# Patient Record
Sex: FEMALE | URBAN - METROPOLITAN AREA
[De-identification: names, ages, dates, MRNs, and addresses within clinical notes are randomized per-mention and may not be internally consistent; named-entity substitution may affect disease eponyms.]

---

## 2022-05-17 ENCOUNTER — WEB ENCOUNTER (OUTPATIENT)
Dept: URBAN - METROPOLITAN AREA CLINIC 50 | Facility: CLINIC | Age: 62
End: 2022-05-17

## 2022-05-17 ENCOUNTER — LAB OUTSIDE AN ENCOUNTER (OUTPATIENT)
Dept: URBAN - METROPOLITAN AREA CLINIC 50 | Facility: CLINIC | Age: 62
End: 2022-05-17

## 2022-05-17 ENCOUNTER — OFFICE VISIT (OUTPATIENT)
Dept: URBAN - METROPOLITAN AREA CLINIC 50 | Facility: CLINIC | Age: 62
End: 2022-05-17
Payer: COMMERCIAL

## 2022-05-17 VITALS
SYSTOLIC BLOOD PRESSURE: 115 MMHG | BODY MASS INDEX: 26.93 KG/M2 | HEART RATE: 83 BPM | DIASTOLIC BLOOD PRESSURE: 84 MMHG | WEIGHT: 152 LBS | TEMPERATURE: 94.5 F | HEIGHT: 63 IN

## 2022-05-17 DIAGNOSIS — K70.31 ALCOHOLIC CIRRHOSIS OF LIVER WITH ASCITES: ICD-10-CM

## 2022-05-17 DIAGNOSIS — R11.0 NAUSEA: ICD-10-CM

## 2022-05-17 DIAGNOSIS — I85.00 ESOPHAGEAL VARICES WITHOUT BLEEDING, UNSPECIFIED ESOPHAGEAL VARICES TYPE: ICD-10-CM

## 2022-05-17 DIAGNOSIS — R19.7 DIARRHEA, UNSPECIFIED TYPE: ICD-10-CM

## 2022-05-17 PROBLEM — 14223005: Status: ACTIVE | Noted: 2022-05-17

## 2022-05-17 PROCEDURE — 99245 OFF/OP CONSLTJ NEW/EST HI 55: CPT | Performed by: INTERNAL MEDICINE

## 2022-05-17 PROCEDURE — 99204 OFFICE O/P NEW MOD 45 MIN: CPT | Performed by: INTERNAL MEDICINE

## 2022-05-17 RX ORDER — ONDANSETRON 4 MG/1
1 TABLET ON THE TONGUE AND ALLOW TO DISSOLVE PRN TABLET, ORALLY DISINTEGRATING ORAL ONCE A DAY
Qty: 20 | Refills: 0 | OUTPATIENT

## 2022-05-17 NOTE — HPI-TODAY'S VISIT:
referred by Dr. Pasquale Driver for cirrhosis copy of the note sent to referring MD six months of abdominal distension/LE edema. also w/ diarrhea. also w/ nausea. dirhea for last 3 days. taking immodium. was hospitalized at [facility] 3 years ago, unclear  3 plus years ago has had paracentesis pcp told her she has cirrhosis. nonsmoker. drink etoh, 10-12 beers/day, 15 plus years. has had problems w/ alcohol her whole life  used to drink a whole bottle of wine in the past, liquor no Hepatitis smokes weed daily reports hx of EV. denies GI bleeding  w/ her today. states he cannot stop providing her etoh b/c she will get shakes/seizures.

## 2022-05-20 ENCOUNTER — TELEPHONE ENCOUNTER (OUTPATIENT)
Dept: URBAN - METROPOLITAN AREA CLINIC 80 | Facility: CLINIC | Age: 62
End: 2022-05-20

## 2022-05-25 ENCOUNTER — CLAIMS CREATED FROM THE CLAIM WINDOW (OUTPATIENT)
Dept: URBAN - METROPOLITAN AREA CLINIC 79 | Facility: CLINIC | Age: 62
End: 2022-05-25

## 2022-05-25 ENCOUNTER — OFFICE VISIT (OUTPATIENT)
Dept: URBAN - METROPOLITAN AREA CLINIC 79 | Facility: CLINIC | Age: 62
End: 2022-05-25

## 2022-05-25 ENCOUNTER — CLAIMS CREATED FROM THE CLAIM WINDOW (OUTPATIENT)
Dept: URBAN - METROPOLITAN AREA CLINIC 79 | Facility: CLINIC | Age: 62
End: 2022-05-25
Payer: COMMERCIAL

## 2022-05-25 DIAGNOSIS — K70.31 ALCOHOLIC CIRRHOSIS OF LIVER WITH ASCITES: ICD-10-CM

## 2022-05-25 PROCEDURE — 76700 US EXAM ABDOM COMPLETE: CPT | Performed by: INTERNAL MEDICINE

## 2022-05-25 RX ORDER — ONDANSETRON 4 MG/1
1 TABLET ON THE TONGUE AND ALLOW TO DISSOLVE PRN TABLET, ORALLY DISINTEGRATING ORAL ONCE A DAY
Qty: 20 | Refills: 0 | Status: ACTIVE | COMMUNITY

## 2022-05-26 ENCOUNTER — TELEPHONE ENCOUNTER (OUTPATIENT)
Dept: URBAN - METROPOLITAN AREA CLINIC 92 | Facility: CLINIC | Age: 62
End: 2022-05-26

## 2022-05-26 LAB
A/G RATIO: 0.9
AFP, SERUM, TUMOR MARKER: 6.2
ALBUMIN: 3.2
ALKALINE PHOSPHATASE: 85
ALT (SGPT): 21
AST (SGOT): 38
BASO (ABSOLUTE): 0
BASOS: 0
BILIRUBIN, TOTAL: 0.7
BUN/CREATININE RATIO: 9
BUN: 4
CALCIUM: 8.8
CARBON DIOXIDE, TOTAL: 20
CHLORIDE: 91
CREATININE: 0.47
EGFR: 106
EOS (ABSOLUTE): 0.2
EOS: 2
GGT: 88
GLOBULIN, TOTAL: 3.6
GLUCOSE: 183
HBSAG SCREEN: NEGATIVE
HCV AB: 0.2
HEMATOCRIT: 25.6
HEMATOLOGY COMMENTS:: (no result)
HEMOGLOBIN A1C: 7
HEMOGLOBIN: 7.6
HEP A AB, IGM: NEGATIVE
HEP B CORE AB, IGM: NEGATIVE
IMMATURE CELLS: (no result)
IMMATURE GRANS (ABS): 0.1
IMMATURE GRANULOCYTES: 1
INR: 1.1
INTERPRETATION:: (no result)
LYMPHS (ABSOLUTE): 2.3
LYMPHS: 21
MCH: 22.1
MCHC: 29.7
MCV: 74
MONOCYTES(ABSOLUTE): 1.1
MONOCYTES: 10
NEUTROPHILS (ABSOLUTE): 7.1
NEUTROPHILS: 66
NRBC: (no result)
PLATELETS: 362
POTASSIUM: 4.5
PROTEIN, TOTAL: 6.8
PROTHROMBIN TIME: 11.7
RBC: 3.44
RDW: 18.6
SODIUM: 129
WBC: 10.8

## 2022-06-01 ENCOUNTER — TELEPHONE ENCOUNTER (OUTPATIENT)
Dept: URBAN - METROPOLITAN AREA CLINIC 92 | Facility: CLINIC | Age: 62
End: 2022-06-01

## 2022-06-15 ENCOUNTER — OUT OF OFFICE VISIT (OUTPATIENT)
Dept: URBAN - METROPOLITAN AREA MEDICAL CENTER 25 | Facility: MEDICAL CENTER | Age: 62
End: 2022-06-15
Payer: COMMERCIAL

## 2022-06-15 DIAGNOSIS — R19.5 ABNORMAL CONSISTENCY OF STOOL: ICD-10-CM

## 2022-06-15 DIAGNOSIS — R93.3 ABN FINDINGS-GI TRACT: ICD-10-CM

## 2022-06-15 DIAGNOSIS — D50.9 ANEMIA: ICD-10-CM

## 2022-06-15 DIAGNOSIS — K74.60 ADVANCED CIRRHOSIS: ICD-10-CM

## 2022-06-15 DIAGNOSIS — R18.8 ABDOMINAL ASCITES: ICD-10-CM

## 2022-06-15 PROCEDURE — 99232 SBSQ HOSP IP/OBS MODERATE 35: CPT | Performed by: INTERNAL MEDICINE

## 2022-06-15 PROCEDURE — 99222 1ST HOSP IP/OBS MODERATE 55: CPT | Performed by: INTERNAL MEDICINE

## 2022-06-15 PROCEDURE — G8427 DOCREV CUR MEDS BY ELIG CLIN: HCPCS | Performed by: INTERNAL MEDICINE

## 2022-06-27 ENCOUNTER — CLAIMS CREATED FROM THE CLAIM WINDOW (OUTPATIENT)
Dept: URBAN - METROPOLITAN AREA MEDICAL CENTER 25 | Facility: MEDICAL CENTER | Age: 62
End: 2022-06-27
Payer: COMMERCIAL

## 2022-06-27 ENCOUNTER — CLAIMS CREATED FROM THE CLAIM WINDOW (OUTPATIENT)
Dept: URBAN - METROPOLITAN AREA MEDICAL CENTER 25 | Facility: MEDICAL CENTER | Age: 62
End: 2022-06-27

## 2022-06-27 DIAGNOSIS — D50.9 ANEMIA: ICD-10-CM

## 2022-06-27 DIAGNOSIS — K70.31 ALCOHOLIC CIRRHOSIS OF LIVER WITH ASCITES: ICD-10-CM

## 2022-06-27 DIAGNOSIS — R93.3 ABN FINDINGS-GI TRACT: ICD-10-CM

## 2022-06-27 DIAGNOSIS — R19.5 ABNORMAL CONSISTENCY OF STOOL: ICD-10-CM

## 2022-06-27 PROCEDURE — 99233 SBSQ HOSP IP/OBS HIGH 50: CPT | Performed by: INTERNAL MEDICINE

## 2022-06-28 ENCOUNTER — OUT OF OFFICE VISIT (OUTPATIENT)
Dept: URBAN - METROPOLITAN AREA MEDICAL CENTER 25 | Facility: MEDICAL CENTER | Age: 62
End: 2022-06-28
Payer: COMMERCIAL

## 2022-06-28 DIAGNOSIS — R11.2 ACUTE NAUSEA WITH NONBILIOUS VOMITING: ICD-10-CM

## 2022-06-28 DIAGNOSIS — D62 ABLA (ACUTE BLOOD LOSS ANEMIA): ICD-10-CM

## 2022-06-28 DIAGNOSIS — K31.89 ACQUIRED DEFORMITY OF DUODENUM: ICD-10-CM

## 2022-06-28 PROCEDURE — 43235 EGD DIAGNOSTIC BRUSH WASH: CPT | Performed by: STUDENT IN AN ORGANIZED HEALTH CARE EDUCATION/TRAINING PROGRAM

## 2022-06-29 ENCOUNTER — CLAIMS CREATED FROM THE CLAIM WINDOW (OUTPATIENT)
Dept: URBAN - METROPOLITAN AREA MEDICAL CENTER 25 | Facility: MEDICAL CENTER | Age: 62
End: 2022-06-29
Payer: COMMERCIAL

## 2022-06-29 ENCOUNTER — CLAIMS CREATED FROM THE CLAIM WINDOW (OUTPATIENT)
Dept: URBAN - METROPOLITAN AREA MEDICAL CENTER 25 | Facility: MEDICAL CENTER | Age: 62
End: 2022-06-29

## 2022-06-29 DIAGNOSIS — K74.60 ADVANCED CIRRHOSIS: ICD-10-CM

## 2022-06-29 DIAGNOSIS — D50.9 ANEMIA: ICD-10-CM

## 2022-06-29 DIAGNOSIS — R18.8 ABDOMINAL ASCITES: ICD-10-CM

## 2022-06-29 DIAGNOSIS — R19.5 ABNORMAL CONSISTENCY OF STOOL: ICD-10-CM

## 2022-06-29 PROCEDURE — 99232 SBSQ HOSP IP/OBS MODERATE 35: CPT | Performed by: INTERNAL MEDICINE

## 2022-07-07 PROBLEM — 420054005: Status: ACTIVE | Noted: 2022-05-17

## 2022-07-12 ENCOUNTER — OFFICE VISIT (OUTPATIENT)
Dept: URBAN - METROPOLITAN AREA CLINIC 2 | Facility: CLINIC | Age: 62
End: 2022-07-12

## 2022-07-12 VITALS — HEIGHT: 63 IN

## 2022-07-12 RX ORDER — ONDANSETRON 4 MG/1
1 TABLET ON THE TONGUE AND ALLOW TO DISSOLVE PRN TABLET, ORALLY DISINTEGRATING ORAL ONCE A DAY
Qty: 20 | Refills: 0 | Status: ACTIVE | COMMUNITY

## 2023-08-11 ENCOUNTER — WEB ENCOUNTER (OUTPATIENT)
Dept: URBAN - METROPOLITAN AREA CLINIC 96 | Facility: CLINIC | Age: 63
End: 2023-08-11

## 2023-08-11 ENCOUNTER — TELEPHONE ENCOUNTER (OUTPATIENT)
Dept: URBAN - METROPOLITAN AREA CLINIC 98 | Facility: CLINIC | Age: 63
End: 2023-08-11

## 2023-08-11 ENCOUNTER — DASHBOARD ENCOUNTERS (OUTPATIENT)
Age: 63
End: 2023-08-11

## 2023-08-11 ENCOUNTER — OFFICE VISIT (OUTPATIENT)
Dept: URBAN - METROPOLITAN AREA CLINIC 96 | Facility: CLINIC | Age: 63
End: 2023-08-11
Payer: COMMERCIAL

## 2023-08-11 VITALS
DIASTOLIC BLOOD PRESSURE: 81 MMHG | TEMPERATURE: 98.2 F | SYSTOLIC BLOOD PRESSURE: 142 MMHG | BODY MASS INDEX: 22.32 KG/M2 | HEIGHT: 63 IN | WEIGHT: 126 LBS

## 2023-08-11 DIAGNOSIS — R19.7 DIARRHEA, UNSPECIFIED TYPE: ICD-10-CM

## 2023-08-11 DIAGNOSIS — R11.0 NAUSEA: ICD-10-CM

## 2023-08-11 DIAGNOSIS — K70.31 ALCOHOLIC CIRRHOSIS OF LIVER WITH ASCITES: ICD-10-CM

## 2023-08-11 DIAGNOSIS — I85.00 ESOPHAGEAL VARICES WITHOUT BLEEDING, UNSPECIFIED ESOPHAGEAL VARICES TYPE: ICD-10-CM

## 2023-08-11 PROCEDURE — 99213 OFFICE O/P EST LOW 20 MIN: CPT

## 2023-08-11 RX ORDER — OMEPRAZOLE 40 MG/1
CAPSULE, DELAYED RELEASE ORAL
Qty: 30 CAPSULE | Status: ACTIVE | COMMUNITY

## 2023-08-11 RX ORDER — ONDANSETRON 4 MG/1
1 TABLET ON THE TONGUE AND ALLOW TO DISSOLVE PRN TABLET, ORALLY DISINTEGRATING ORAL ONCE A DAY
Qty: 20 | Refills: 0 | COMMUNITY

## 2023-08-11 RX ORDER — CHOLESTYRAMINE 4 G/9G
1 PACKET MIXED WITH WATER OR NON-CARBONATED DRINK POWDER, FOR SUSPENSION ORAL ONCE A DAY
Qty: 30 | Refills: 3 | OUTPATIENT
Start: 2023-08-11

## 2023-08-11 RX ORDER — FLUOXETINE HYDROCHLORIDE 20 MG/1
TAKE ONE CAPSULE BY MOUTH ONE TIME DAILY CAPSULE ORAL
Qty: 30 UNSPECIFIED | Refills: 6 | Status: ACTIVE | COMMUNITY

## 2023-08-11 NOTE — HPI-TODAY'S VISIT:
Previously in 2022 with Dr. Dudley, referred by Dr. Pasquale Driver for cirrhosis copy of the note sent to referring MD six months of abdominal distension/LE edema. also w/ diarrhea. also w/ nausea. dirhea for last 3 days. taking immodium. was hospitalized at [facility] 3 years ago, unclear  3 plus years ago has had paracentesis pcp told her she has cirrhosis. nonsmoker. drink etoh, 10-12 beers/day, 15 plus years. has had problems w/ alcohol her whole life  used to drink a whole bottle of wine in the past, liquor no Hepatitis smokes weed daily reports hx of EV. denies GI bleeding  w/ her today. states he cannot stop providing her etoh b/c she will get shakes/seizures. . Today on 8/11/2023, patient endorses she broke her right hip in february- s/p surgery Developed hematoma at surgial site.  Debridement was done and now has wound vac at the time  She has taken multiple rounds of antibiotic  Then saw ID when developed staph infection at surgical site and is currently on at home IV infusions  Currently having diarrhea 1-2 times/day-  Was previously having incontinence epsiodes but not currently  She has been taking imodium- taking 4/day  Can't take pepto Takes otc probiotic- Floragen?  Has seen [facility] from hemorrhoids but is managing with otc products  labs done frequently per patient  Has tried to submit a stool study twice- plans to submit another one today as ordered by ID She notes she also had a hospitalization in 4/2023 for a "bleed" She saw Dr. Denny and he did an EGD on her- she is wondering if can follow up with him as closer to her house

## 2023-08-11 NOTE — PHYSICAL EXAM CONSTITUTIONAL:
well developed, well nourished , in no acute distress , ambulating with walker , normal communication ability, wound vac located on right thigh

## 2023-11-14 ENCOUNTER — OFFICE VISIT (OUTPATIENT)
Dept: URBAN - METROPOLITAN AREA CLINIC 2 | Facility: CLINIC | Age: 63
End: 2023-11-14

## 2024-01-30 ENCOUNTER — OFFICE VISIT (OUTPATIENT)
Dept: URBAN - METROPOLITAN AREA CLINIC 2 | Facility: CLINIC | Age: 64
End: 2024-01-30

## 2025-05-18 NOTE — PHYSICAL EXAM HENT:
Head, normocephalic, atraumatic, Face, Face within normal limits, Ears, External ears within normal limits Male